# Patient Record
Sex: MALE | Race: OTHER | NOT HISPANIC OR LATINO | Employment: FULL TIME | URBAN - METROPOLITAN AREA
[De-identification: names, ages, dates, MRNs, and addresses within clinical notes are randomized per-mention and may not be internally consistent; named-entity substitution may affect disease eponyms.]

---

## 2024-11-21 ENCOUNTER — APPOINTMENT (OUTPATIENT)
Dept: RADIOLOGY | Facility: HOSPITAL | Age: 24
End: 2024-11-21

## 2024-11-21 ENCOUNTER — HOSPITAL ENCOUNTER (EMERGENCY)
Facility: HOSPITAL | Age: 24
Discharge: HOME | End: 2024-11-21

## 2024-11-21 VITALS
HEART RATE: 80 BPM | HEIGHT: 70 IN | TEMPERATURE: 96.8 F | RESPIRATION RATE: 16 BRPM | BODY MASS INDEX: 25.77 KG/M2 | SYSTOLIC BLOOD PRESSURE: 139 MMHG | OXYGEN SATURATION: 96 % | DIASTOLIC BLOOD PRESSURE: 85 MMHG | WEIGHT: 180 LBS

## 2024-11-21 DIAGNOSIS — S43.101A CLOSED DISLOCATION OF RIGHT ACROMIOCLAVICULAR JOINT, INITIAL ENCOUNTER: Primary | ICD-10-CM

## 2024-11-21 PROCEDURE — 2500000005 HC RX 250 GENERAL PHARMACY W/O HCPCS

## 2024-11-21 PROCEDURE — 73030 X-RAY EXAM OF SHOULDER: CPT | Mod: RIGHT SIDE | Performed by: STUDENT IN AN ORGANIZED HEALTH CARE EDUCATION/TRAINING PROGRAM

## 2024-11-21 PROCEDURE — 96372 THER/PROPH/DIAG INJ SC/IM: CPT

## 2024-11-21 PROCEDURE — 2500000004 HC RX 250 GENERAL PHARMACY W/ HCPCS (ALT 636 FOR OP/ED)

## 2024-11-21 PROCEDURE — 73030 X-RAY EXAM OF SHOULDER: CPT | Mod: RT

## 2024-11-21 PROCEDURE — 99284 EMERGENCY DEPT VISIT MOD MDM: CPT

## 2024-11-21 PROCEDURE — 99283 EMERGENCY DEPT VISIT LOW MDM: CPT

## 2024-11-21 RX ORDER — KETOROLAC TROMETHAMINE 15 MG/ML
15 INJECTION, SOLUTION INTRAMUSCULAR; INTRAVENOUS ONCE
Status: COMPLETED | OUTPATIENT
Start: 2024-11-21 | End: 2024-11-21

## 2024-11-21 RX ORDER — LIDOCAINE 560 MG/1
1 PATCH PERCUTANEOUS; TOPICAL; TRANSDERMAL ONCE
Status: DISCONTINUED | OUTPATIENT
Start: 2024-11-21 | End: 2024-11-21 | Stop reason: HOSPADM

## 2024-11-21 ASSESSMENT — PAIN DESCRIPTION - FREQUENCY: FREQUENCY: CONSTANT/CONTINUOUS

## 2024-11-21 ASSESSMENT — PAIN - FUNCTIONAL ASSESSMENT: PAIN_FUNCTIONAL_ASSESSMENT: 0-10

## 2024-11-21 ASSESSMENT — PAIN DESCRIPTION - DESCRIPTORS: DESCRIPTORS: BURNING;SHARP

## 2024-11-21 ASSESSMENT — PAIN SCALES - GENERAL
PAINLEVEL_OUTOF10: 4
PAINLEVEL_OUTOF10: 8

## 2024-11-21 ASSESSMENT — PAIN DESCRIPTION - ORIENTATION: ORIENTATION: RIGHT

## 2024-11-21 ASSESSMENT — COLUMBIA-SUICIDE SEVERITY RATING SCALE - C-SSRS
6. HAVE YOU EVER DONE ANYTHING, STARTED TO DO ANYTHING, OR PREPARED TO DO ANYTHING TO END YOUR LIFE?: NO
2. HAVE YOU ACTUALLY HAD ANY THOUGHTS OF KILLING YOURSELF?: NO
1. IN THE PAST MONTH, HAVE YOU WISHED YOU WERE DEAD OR WISHED YOU COULD GO TO SLEEP AND NOT WAKE UP?: NO

## 2024-11-21 ASSESSMENT — PAIN DESCRIPTION - PAIN TYPE: TYPE: ACUTE PAIN

## 2024-11-21 ASSESSMENT — PAIN DESCRIPTION - ONSET: ONSET: SUDDEN

## 2024-11-21 ASSESSMENT — PAIN DESCRIPTION - PROGRESSION: CLINICAL_PROGRESSION: NOT CHANGED

## 2024-11-21 ASSESSMENT — PAIN DESCRIPTION - LOCATION: LOCATION: SHOULDER

## 2024-11-21 NOTE — Clinical Note
Ara Pizano was seen and treated in our emergency department on 11/21/2024.  He may return to work on 11/21/2024.  Please allow Ara to leave work early if he feels like he cannot work due to his condition.      If you have any questions or concerns, please don't hesitate to call.      Obi Martinez PA-C

## 2024-11-21 NOTE — Clinical Note
Ara Pizano was seen and treated in our emergency department on 11/21/2024.  He may return to work on 11/27/2024.       If you have any questions or concerns, please don't hesitate to call.      Obi Martinez PA-C

## 2024-11-21 NOTE — ED PROVIDER NOTES
HPI   Chief Complaint   Patient presents with    Shoulder Pain     Pt c/o right shoulder pain , no known injury        HPI    Ara Reyes is a 24-year-old male with no significant medical history presented to the ED with right shoulder pain.  Patient states the pain began around 4:30 PM while he was at work and the pain worsened around 7:30 PM.  Patient states his occupation as a .  Patient states he took Advil for the shoulder pain however it did not relieve the pain.  Patient denies trauma or injury to the right shoulder.  Patient denies numbness and tingling to the right shoulder and arm. Patient states he has decreased range of motion to the right shoulder due to the pain.  Patient states he has had similar pain in the past however the pain tonight is worse. Denies chest pain, shortness of breath, abdominal pain, nausea, vomiting, fevers.    Patient History   History reviewed. No pertinent past medical history.  Past Surgical History:   Procedure Laterality Date    RHINOPLASTY       No family history on file.  Social History     Tobacco Use    Smoking status: Some Days     Types: Cigarettes    Smokeless tobacco: Not on file   Substance Use Topics    Alcohol use: Yes     Comment: socially    Drug use: Never       Physical Exam   ED Triage Vitals [11/21/24 0202]   Temperature Heart Rate Respirations BP   36 °C (96.8 °F) 62 16 116/60      Pulse Ox Temp Source Heart Rate Source Patient Position   100 % Temporal Monitor Sitting      BP Location FiO2 (%)     Left arm --       Physical Exam  Vitals and nursing note reviewed.   Constitutional:       Appearance: Normal appearance.   Pulmonary:      Effort: Pulmonary effort is normal.      Breath sounds: Normal breath sounds.   Abdominal:      General: Abdomen is flat. Bowel sounds are normal. There is no distension.      Palpations: Abdomen is soft. There is no mass.      Tenderness: There is no abdominal tenderness. There is no guarding or rebound.       Hernia: No hernia is present.   Musculoskeletal:      Right shoulder: Tenderness and bony tenderness present. No swelling. Normal range of motion. Normal strength.      Left shoulder: No swelling, tenderness or bony tenderness. Normal range of motion. Normal strength.      Right wrist: Normal pulse.      Left wrist: Normal pulse.      Cervical back: No swelling, tenderness, bony tenderness or crepitus. No pain with movement. Normal range of motion.      Thoracic back: Tenderness present. No swelling or bony tenderness. Normal range of motion.      Lumbar back: No swelling, tenderness or bony tenderness. Normal range of motion.      Right lower leg: No edema.      Left lower leg: No edema.   Skin:     General: Skin is warm and dry.   Neurological:      General: No focal deficit present.      Mental Status: He is alert and oriented to person, place, and time.   Psychiatric:         Mood and Affect: Mood normal.         Behavior: Behavior normal.           ED Course & MDM   Diagnoses as of 11/21/24 2051   Closed dislocation of right acromioclavicular joint, initial encounter                 No data recorded     [place] Coma Scale Score: 15 (11/21/24 0211 : Daniella Ortega RN)                           Medical Decision Making  This is a 24-year-old male presenting to the ED with right shoulder pain that began around 4:30 PM while he was at work and worsened around 7:30 PM.  Patient states he took Advil for the shoulder pain however did not relieve the pain.  On exam patient has tenderness to the right thoracic back as well as tenderness to the right shoulder.  Range of motion to the right shoulder is intact.  2+ bilateral radial pulses.  X-ray of the right shoulder was obtained to rule out fracture or dislocation.  X-ray shows grade 2 right acromioclavicular joint separation.  Patient was advised to follow-up with orthopedic surgery for further evaluation and management of the AC joint separation.  Patient was provided  with a sling for the right shoulder and advised to wear the sling at all times until he is seen by orthopedic surgery.  Patient was advised to take Advil at home for pain relief.  Patient states he does not need a prescription.  Patient was given lidocaine patch and Toradol for pain relief today.  Patient has been hemodynamically stable while in the emergency department.    Disposition: Discharge home  Patient was advised to follow-up with orthopedic surgery for further evaluation and management of AC joint separation.  Referral and contact information orthopedic surgery has been included in discharge paperwork.  Patient also states he needs a primary care provider therefore referral and contact information for the family medicine clinic has been included in discharge paperwork.  Strict return precautions were given.  Plan was discussed with the patient and the patient understands and agrees.  Patient was discharged in stable condition.    Procedure  Procedures     Obi Martinez PA-C  11/21/24 2056

## 2024-11-21 NOTE — DISCHARGE INSTRUCTIONS
Please follow-up with orthopedic surgery provider within 1 week for further evaluation and management of the acromioclavicular joint dislocation.  Referral and contact information for the orthopedic surgery clinic has been included in discharge paperwork.  Take Advil at home for pain relief.  Keep your right arm in a sling for 1 week.  Return to the emergency department if shoulder pain persist and worsens or any new symptoms again.

## 2024-11-26 ENCOUNTER — OFFICE VISIT (OUTPATIENT)
Dept: ORTHOPEDIC SURGERY | Facility: CLINIC | Age: 24
End: 2024-11-26

## 2024-11-26 DIAGNOSIS — M54.12 CERVICAL RADICULAR PAIN: Primary | ICD-10-CM

## 2024-11-26 DIAGNOSIS — S43.101A CLOSED DISLOCATION OF RIGHT ACROMIOCLAVICULAR JOINT, INITIAL ENCOUNTER: ICD-10-CM

## 2024-11-26 PROCEDURE — 99204 OFFICE O/P NEW MOD 45 MIN: CPT | Performed by: STUDENT IN AN ORGANIZED HEALTH CARE EDUCATION/TRAINING PROGRAM

## 2024-11-26 PROCEDURE — 99214 OFFICE O/P EST MOD 30 MIN: CPT | Performed by: STUDENT IN AN ORGANIZED HEALTH CARE EDUCATION/TRAINING PROGRAM

## 2024-11-26 RX ORDER — METHYLPREDNISOLONE 4 MG/1
4 TABLET ORAL ONCE
Qty: 21 TABLET | Refills: 0 | Status: SHIPPED | OUTPATIENT
Start: 2024-11-26 | End: 2024-11-26

## 2024-11-26 NOTE — PROGRESS NOTES
CHIEF COMPLAINT:   Chief Complaint   Patient presents with    Right Shoulder - Injury     History: 24 y.o. male presents to the office today for evaluation of his right neck and shoulder.  The patient is here with his family member who is interpreting for him.  They do work a job with a carry heavy things and carve meat.  They state that few days ago the patient had severe pain coming from the neck to the posterior trapezius.  There was seen in the hospital and given what sounds like a Toradol injection as well as some anti-inflammatories.  The patient has been in a sling is been unable to work due to this pain.  No specific injury he can remember.     Past medical history, past surgical history, medications, allergies, family history, social history, and review of systems were reviewed today.    A 12 point review of systems was negative other than as stated in the HPI.    History reviewed. No pertinent past medical history.     No Known Allergies     Past Surgical History:   Procedure Laterality Date    RHINOPLASTY          No family history on file.     Social History     Socioeconomic History    Marital status: Single     Spouse name: Not on file    Number of children: Not on file    Years of education: Not on file    Highest education level: Not on file   Occupational History    Not on file   Tobacco Use    Smoking status: Some Days     Types: Cigarettes    Smokeless tobacco: Not on file   Substance and Sexual Activity    Alcohol use: Yes     Comment: socially    Drug use: Never    Sexual activity: Not on file   Other Topics Concern    Not on file   Social History Narrative    Not on file     Social Drivers of Health     Financial Resource Strain: Not on file   Food Insecurity: Not on file   Transportation Needs: Not on file   Physical Activity: Not on file   Stress: Not on file   Social Connections: Not on file   Intimate Partner Violence: Not on file   Housing Stability: Not on file        CURRENT MEDICATIONS:  "  Current Outpatient Medications   Medication Sig Dispense Refill    methylPREDNISolone (Medrol Dospak) 4 mg tablets Take 1 tablet (4 mg) by mouth 1 time for 1 dose. Use as directed by package instructions 21 tablet 0     No current facility-administered medications for this visit.       Physical Examination:      11/21/2024     2:02 AM 11/21/2024     3:57 AM   Vitals   Systolic 116 139   Diastolic 60 85   BP Location Left arm    Heart Rate 62 80   Temp 36 °C (96.8 °F)    Resp 16 16   Height 1.778 m (5' 10\")    Weight (lb) 180    BMI 25.83 kg/m2    BSA (m2) 2.01 m2       There is no height or weight on file to calculate BMI.    Well-appearing, appears stated age, pleasant and cooperative, appropriate mood and behavior. Height and weight reviewed. Alert and oriented x3.  Auditory function intact.  No acute distress.  Intact ocular function, ABE, EOMI. Breathing is unlabored .  There is no evidence of jugular venous distension. Skin appearance is normal without evidence of rash or other lesions. 2+ radial pulses bilaterally, fingers pink and wwp, good capillary refill, no pitting edema. No appreciable lymphadenopathy in bilateral upper extremities. SILT throughout both upper extremities, median/radial/ulnar/musculocutaneous/axillary nerve motor and sensory intact (except for abnormalities noted in focused musculoskeletal exam section below).     Neck exam: Limited range of motion of the neck in flexion/extension and rotational movements.   Tenderness palpation of the paracervical area.  Pain that radiates with neck motion.    On exam of bilateral upper extremities, the patient does have preserved range of motion and strength of both shoulders.  No tenderness palpation over the AC joint.  Forward flexion 170, external Tatian is 60, to rotation to T12.  Full strength rotator cuff strength testing.  Minimal pain in the shoulder with passive maneuvers.    Imaging: Radiographs of the right shoulder were reviewed today.  " Personally interpreted by myself.  The joint space is preserved.  There is mild elevation of the clavicle in line with a type II AC separation.    Assessment: Cervical radiculopathy    Plan: I discussed with the patient and the family that I do think that the shoulder actually is not causing him any issues.  He likely had a prior AC joint injury that is asymptomatic at this point.  Told him there is nothing to do about this at this time.  Regarding the pain he is having I think the majority this coming from his neck.  Recommended a Medrol Dosepak as well as referral to physical therapy and a neck specialist.  They voiced understanding of this.  From my standpoint he can work once he feels comfortable      Dragon software was used to dictate this note, please be aware that minor errors in transcription may be present.    Vineet Moore MD    Shoulder/Elbow Surgery  [facility]

## 2024-12-05 ENCOUNTER — APPOINTMENT (OUTPATIENT)
Dept: ORTHOPEDIC SURGERY | Facility: CLINIC | Age: 24
End: 2024-12-05